# Patient Record
Sex: MALE | Race: WHITE | NOT HISPANIC OR LATINO | URBAN - METROPOLITAN AREA
[De-identification: names, ages, dates, MRNs, and addresses within clinical notes are randomized per-mention and may not be internally consistent; named-entity substitution may affect disease eponyms.]

---

## 2024-07-09 ENCOUNTER — OFFICE (OUTPATIENT)
Dept: URBAN - METROPOLITAN AREA CLINIC 11 | Facility: CLINIC | Age: 64
End: 2024-07-09
Payer: COMMERCIAL

## 2024-07-09 VITALS
WEIGHT: 253.8 LBS | DIASTOLIC BLOOD PRESSURE: 81 MMHG | SYSTOLIC BLOOD PRESSURE: 176 MMHG | OXYGEN SATURATION: 99 % | HEIGHT: 71 IN | HEART RATE: 65 BPM

## 2024-07-09 DIAGNOSIS — R11.2 NAUSEA WITH VOMITING, UNSPECIFIED: ICD-10-CM

## 2024-07-09 DIAGNOSIS — R13.10 DYSPHAGIA, UNSPECIFIED: ICD-10-CM

## 2024-07-09 DIAGNOSIS — K22.70 BARRETT'S ESOPHAGUS WITHOUT DYSPLASIA: ICD-10-CM

## 2024-07-09 DIAGNOSIS — K21.9 GASTRO-ESOPHAGEAL REFLUX DISEASE WITHOUT ESOPHAGITIS: ICD-10-CM

## 2024-07-09 PROCEDURE — 99204 OFFICE O/P NEW MOD 45 MIN: CPT | Performed by: NURSE PRACTITIONER

## 2024-07-09 NOTE — SERVICENOTES
will get him scheduled for Esophageal manometry and he will f/u back up with [facility] at [facility].   He has had a colonoscopy at the [facility].

## 2024-07-09 NOTE — SERVICEHPINOTES
Mr. Davis is a 63 year old male referred by the [facility]  for esophageal manometry to further evaluate dysphagia, hiatal hernia. He had an EGD on 05/03/2024 at [facility] that showed a large hiatal hernia and a Dalton's looking esophagus.  according to the patient, pathology was positive for Dalton's esophagus. He is on pantoprazole for GERD but continues to have heartburn, reflux. He uses Tums and Pepto-Bismol and that resolves his symptoms. He has had dysphagia for quite some time and bulky foods and larger pills get stuck in his throat.  It has caused him to choke and vomit them back up.  He has regular bowel movements and denies any change in bowel habits or blood in stool.